# Patient Record
Sex: FEMALE | Race: WHITE | NOT HISPANIC OR LATINO | Employment: UNEMPLOYED | RURAL
[De-identification: names, ages, dates, MRNs, and addresses within clinical notes are randomized per-mention and may not be internally consistent; named-entity substitution may affect disease eponyms.]

---

## 2022-09-01 RX ORDER — BUPRENORPHINE AND NALOXONE 8; 2 MG/1; MG/1
FILM, SOLUBLE BUCCAL; SUBLINGUAL
COMMUNITY
Start: 2022-07-21 | End: 2023-03-23

## 2022-09-01 RX ORDER — NALOXONE HYDROCHLORIDE 4 MG/.1ML
SPRAY NASAL
COMMUNITY
Start: 2022-07-18

## 2022-09-01 RX ORDER — CLONIDINE HYDROCHLORIDE 0.1 MG/1
TABLET ORAL
COMMUNITY
Start: 2022-06-22 | End: 2022-09-02

## 2022-09-02 ENCOUNTER — OFFICE VISIT (OUTPATIENT)
Dept: FAMILY MEDICINE CLINIC | Facility: CLINIC | Age: 37
End: 2022-09-02

## 2022-09-02 VITALS
OXYGEN SATURATION: 98 % | DIASTOLIC BLOOD PRESSURE: 78 MMHG | HEIGHT: 64 IN | SYSTOLIC BLOOD PRESSURE: 128 MMHG | RESPIRATION RATE: 12 BRPM | TEMPERATURE: 97.6 F | HEART RATE: 84 BPM | BODY MASS INDEX: 22.88 KG/M2 | WEIGHT: 134 LBS

## 2022-09-02 DIAGNOSIS — F15.11 METHAMPHETAMINE ABUSE IN REMISSION: ICD-10-CM

## 2022-09-02 DIAGNOSIS — F41.1 GENERALIZED ANXIETY DISORDER: Primary | ICD-10-CM

## 2022-09-02 DIAGNOSIS — F17.210 MODERATE SMOKER (20 OR LESS PER DAY): ICD-10-CM

## 2022-09-02 DIAGNOSIS — F11.11 OPIOID ABUSE, IN REMISSION: ICD-10-CM

## 2022-09-02 PROBLEM — F32.A DEPRESSION: Status: ACTIVE | Noted: 2022-09-02

## 2022-09-02 PROBLEM — F41.9 ANXIETY: Status: RESOLVED | Noted: 2022-09-02 | Resolved: 2022-09-02

## 2022-09-02 PROBLEM — F41.9 ANXIETY: Status: ACTIVE | Noted: 2022-09-02

## 2022-09-02 PROCEDURE — 99203 OFFICE O/P NEW LOW 30 MIN: CPT | Performed by: INTERNAL MEDICINE

## 2022-09-02 RX ORDER — ESCITALOPRAM OXALATE 10 MG/1
10 TABLET ORAL DAILY
Qty: 30 TABLET | Refills: 1 | Status: SHIPPED | OUTPATIENT
Start: 2022-09-02 | End: 2022-09-26

## 2022-09-02 NOTE — PROGRESS NOTES
"    Follow Up Office Visit      Date: 2022   Patient Name: Ella Bravo  : 1985   MRN: [id number]     Chief Complaint:    Chief Complaint   Patient presents with   • twithching of hands     Refer need   • Anxiety       History of Present Illness: Ella Bravo is a 37 y.o. female who is here today for an introductory visit, history of methamphetamine and opiate abuse for a period of 10 years overall, including a 1 year.  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  She indicates she is \"a ball of nerves\", having some anxiousness, difficulty sleeping though she does not feel that she is depressed.  She also is always constantly moving all the time, feeling like people feel that she is on drugs secondarily.  She wonders if she may have damaged her body with her drug abuse in the past.  She actually thought she might require neurology consultation to see if there was something wrong with her body regarding this constant need to move around.  No numbness or tingling, no weakness.  Denies ever having been formally diagnosed with any mood disorder other than substance abuse, specifically no history of anxiety depression or bipolar disorder.  Denies any history of alcohol abuse..    Subjective      Review of Systems:   Review of Systems    I have reviewed the patients family history, social history, past medical history, past surgical history and have updated it as appropriate.     Medications:     Current Outpatient Medications:   •  buprenorphine-naloxone (SUBOXONE) 8-2 MG film film, Place 2 film under tongue once a day as directed Fill and start 2022, Disp: , Rfl:   •  naloxone (NARCAN) 4 MG/0.1ML nasal spray, Spray 1 spray into both nostrils as directed every 2 mins as " "needed for overdose. Repeat every 2 mins until EMS arrives or responsive, Disp: , Rfl:   •  escitalopram (Lexapro) 10 MG tablet, Take 1 tablet by mouth Daily., Disp: 30 tablet, Rfl: 1    Allergies:   No Known Allergies    Objective     Physical Exam: Please see above  Vital Signs:   Vitals:    09/02/22 1530   BP: 128/78   BP Location: Left arm   Patient Position: Sitting   Cuff Size: Adult   Pulse: 84   Resp: 12   Temp: 97.6 °F (36.4 °C)   TempSrc: Temporal   SpO2: 98%   Weight: 60.8 kg (134 lb)   Height: 162.6 cm (64\")     Body mass index is 23 kg/m².  BMI is within normal parameters. No other follow-up for BMI required.       Physical Exam  General: Pleasant slender healthy-appearing 37-year-old white female who is always constantly moving around, cannot stand still, alert and oriented, does not appear to be under the influence of any substances.  Lungs clear  Cardiac regular rate rhythm with no murmurs gallops or rubs  Neurological exam reveals cranial nerves II through XII intact, motor and sensory exam normal, biceps and patellar reflexes symmetric and normal, finger-to-nose testing normal, heel-to-toe gait normal, Romberg negative, normal narrow-based heel-to-toe gait, fluent speech.  Procedures    Results:   Mood disorder questionnaire bipolar disorder positive for 6 of 13 items consistent with a borderline positive study  MEGHAN-7 screening for anxiety score of 19 consistent with moderate anxiety  Patient health questionnaire-9 screening for depression score of 5 consistent with very borderline dysthymia    Imaging:   No valid procedures specified.     Smoking Cessation:   less than 3 minutes spent counseling Will try to cut down    Assessment / Plan      Assessment/Plan:   Diagnoses and all orders for this visit:    1. Generalized anxiety disorder (Primary)  -     escitalopram (Lexapro) 10 MG tablet; Take 1 tablet by mouth Daily.  Dispense: 30 tablet; Refill: 1  Patient having significant anxiousness which " could certainly explain some of her propensity for constant movement.  Does not appear to have any focal neurological problems at this time.  We have agreed to initiate a trial of Lexapro 10 mg daily with delayed onset and benefits anticipated and early potential onset and side effects discussed.  Reassess clinically in 1 month.  We will plan to update labs at that time.  2. 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  Encouraged ongoing abstinence.  3. Opioid abuse, in remission (HCC)  Patient indicating again she has been in remission since 2/2022.  Again I encouraged ongoing abstinence.  She is on Suboxone.  4. Moderate smoker (20 or less per day)  Advised strongly of need to discontinue habit for health purposes.      Follow Up:   Return in about 1 month (around 10/2/2022) for Recheck.      At [facility], we believe that sharing information builds trust and better relationships. You are receiving this note because you recently visited [facility]. It is possible you will see health information before a provider has talked with you about it. This kind of information can be easy to misunderstand. To help you fully understand what it means for your health, we urge you to discuss this note with your provider.    Pino Plunkett MD  [facility]

## 2022-09-26 DIAGNOSIS — F41.1 GENERALIZED ANXIETY DISORDER: ICD-10-CM

## 2022-09-26 RX ORDER — ESCITALOPRAM OXALATE 10 MG/1
10 TABLET ORAL DAILY
Qty: 30 TABLET | Refills: 1 | Status: SHIPPED | OUTPATIENT
Start: 2022-09-26 | End: 2022-11-14

## 2022-11-14 DIAGNOSIS — F41.1 GENERALIZED ANXIETY DISORDER: ICD-10-CM

## 2022-11-14 RX ORDER — ESCITALOPRAM OXALATE 10 MG/1
10 TABLET ORAL DAILY
Qty: 30 TABLET | Refills: 1 | Status: SHIPPED | OUTPATIENT
Start: 2022-11-14 | End: 2023-03-23 | Stop reason: SDUPTHER

## 2023-01-30 DIAGNOSIS — F41.1 GENERALIZED ANXIETY DISORDER: ICD-10-CM

## 2023-01-30 RX ORDER — ESCITALOPRAM OXALATE 10 MG/1
10 TABLET ORAL DAILY
Qty: 30 TABLET | Refills: 1 | OUTPATIENT
Start: 2023-01-30

## 2023-03-23 ENCOUNTER — OFFICE VISIT (OUTPATIENT)
Dept: FAMILY MEDICINE CLINIC | Facility: CLINIC | Age: 38
End: 2023-03-23
Payer: COMMERCIAL

## 2023-03-23 VITALS
DIASTOLIC BLOOD PRESSURE: 81 MMHG | OXYGEN SATURATION: 97 % | WEIGHT: 150.4 LBS | SYSTOLIC BLOOD PRESSURE: 123 MMHG | TEMPERATURE: 98.3 F | HEART RATE: 80 BPM | HEIGHT: 64 IN | BODY MASS INDEX: 25.68 KG/M2

## 2023-03-23 DIAGNOSIS — F41.1 GENERALIZED ANXIETY DISORDER: ICD-10-CM

## 2023-03-23 DIAGNOSIS — G56.03 BILATERAL CARPAL TUNNEL SYNDROME: ICD-10-CM

## 2023-03-23 DIAGNOSIS — F11.11 OPIOID ABUSE, IN REMISSION: ICD-10-CM

## 2023-03-23 DIAGNOSIS — G25.9 EXTRAPYRAMIDAL MOVEMENT DISORDER: Primary | ICD-10-CM

## 2023-03-23 DIAGNOSIS — F14.91 COCAINE USE DISORDER IN REMISSION: ICD-10-CM

## 2023-03-23 DIAGNOSIS — F17.211 CIGARETTE NICOTINE DEPENDENCE IN REMISSION: ICD-10-CM

## 2023-03-23 PROBLEM — F15.11 METHAMPHETAMINE ABUSE IN REMISSION: Status: RESOLVED | Noted: 2022-09-02 | Resolved: 2023-03-23

## 2023-03-23 RX ORDER — ESCITALOPRAM OXALATE 10 MG/1
10 TABLET ORAL DAILY
Qty: 30 TABLET | Refills: 5 | Status: SHIPPED | OUTPATIENT
Start: 2023-03-23

## 2023-03-23 NOTE — PROGRESS NOTES
Follow Up Office Visit      Date: 2023   Patient Name: Ella Bravo  : 1985   MRN: [id number]     Chief Complaint:    Chief Complaint   Patient presents with   • numbness in hands       History of Present Illness: Ella Bravo is a 38 y.o. female who is here today for 6-month follow-up having been started at that time on Lexapro 10 mg daily, noting she has had a significant improvement in her anxiety disorder since she has been on the medication.  Really does not have a lot of depressive symptoms.  She ran out of Lexapro recently has had some recurrence of anxiousness since.  She also has a history of approximate 10 years of pill form opiate abuse, most recent usage by history and 2022, and has a 2-year history of cocaine abuse, initially intranasally then subsequent intravenous, last use by history in 2021, currently under court order getting regular drug screens as well as apparently HIV and hepatitis screening.  Indicates she is very motivated to stay clean at this time.  She has a history of constant movement disorder in the past, to the point that people always think she is high on drugs, constantly moving her arms and legs, apparently having had police called on her when she was shopping.  She indicates that this has gotten better since she has stopped using illicit drugs but still occasionally bothers her especially when she is stressed out.  She indicates that she has been asked by her court  to see a neurologist to clarify this problem.  She also notes fairly regularly for the last year having some numbness in both hands when she wakes up in the morning, this rapidly resolving not occurring again till she wakes up the next morning.  She does have a history previously of left-sided carpal tunnel surgery approximately 8 years ago.  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  Finally she notes that she stopped smoking cigarettes 2 weeks ago, now vaping, after a 24-year history of smoking 1 pack/day.    Subjective      Review of Systems:   Review of Systems    I have reviewed the patients family history, social history, past medical history, past surgical history and have updated it as appropriate.     Medications:     Current Outpatient Medications:   •  buprenorphine-naloxone (SUBOXONE) 8-2 MG per SL tablet, Place 2 tablet under tongue once a day as directed fill today and start tomorrow, Disp: , Rfl:   •  escitalopram (LEXAPRO) 10 MG tablet, Take 1 tablet by mouth Daily., Disp: 30 tablet, Rfl: 5  •  naloxone (NARCAN) 4 MG/0.1ML nasal spray, Spray 1 spray into both nostrils as directed every 2 mins as needed for overdose. Repeat every 2 mins until EMS arrives or responsive, Disp: , Rfl:     Allergies:   No Known Allergies    Objective     Physical Exam: Please see above  Vital Signs:   Vitals:    03/23/23 1553   BP: 123/81   BP Location: Left arm   Patient Position: Sitting   Cuff Size: Adult   Pulse: 80   Temp: 98.3 °F (36.8 °C)   TempSrc: Temporal   SpO2: 97%   Weight: 68.2 kg (150 lb 6.4 oz)   Height: 162.6 cm (64\")     Body mass index is 25.82 kg/m².       Physical Exam  General: Pleasant slender generally healthy appearing 38-year-old female, who is alert and oriented, casually dressed in no acute distress.  She does not appear to be under the influence of any substances.  She occasionally has a slight writhing movement of her shoulders or arms, but markedly less so than when she was seen here in the office 6 months ago, when she is noted to be constantly moving around in her seat, not able to sit still.  Head and neck: No unusual facial movements, neck supple with no masses or tenderness  Lungs clear with no wheeze tachypnea or cough  Cardiac regular rate rhythm with no murmurs gallops or rubs, 2+ radial pulses bilaterally, no dependent " edema  Neurological exam reveals cranial nerves II through XII intact, motor and sensory exam grossly normal, finger-to-nose testing bilaterally intact, he shin rub with her opposite heel intact, 2+ biceps triceps brachioradialis, knee jerks and ankle jerks all symmetric, negative Romberg, heel-to-toe testing normal, gait narrow based and appears normal at this time, negative Tinel's sign and negative Phalen sign at the wrist bilaterally  Procedures    Results:   Labs:   No results found for: HGBA1C, CMP, CBCDIFFPANEL, CREAT, TSH     POCT Results (if applicable):   No results found for this or any previous visit.    Imaging:   No valid procedures specified.     Assessment / Plan      Assessment/Plan:   Diagnoses and all orders for this visit:    1. Extrapyramidal movement disorder (Primary)  -     Ambulatory Referral to Neurology  Patient had noticed significant extrapyramidal type movements of her trunk and extremities, being very restless night able to sit still, when she was last seen here 6 months ago, appears to be much improved and subtle at this time, likely related to her previous substance abuse with opiates and cocaine from which she has been abstaining by history since 2/2022.  Her neurological exam otherwise is normal.  She has never taken any atypical antipsychotics or other related medications in the past.  Her court appointed  as I understand however is requesting specifically that she get a neurology consultation for further evaluation which I think is reasonable under the circumstances, and will make an appropriate referral for second opinion.  2. Bilateral carpal tunnel syndrome  -     Ambulatory Referral to Neurology  Status post previous left carpal tunnel release procedure.  Recommended use of bilateral wrist splints at night as first treatment.  Can get second opinion from neurologist to whom she is being referred for above problem.  3. Generalized anxiety disorder  -      escitalopram (LEXAPRO) 10 MG tablet; Take 1 tablet by mouth Daily.  Dispense: 30 tablet; Refill: 5  Overall doing well taking her Lexapro 10 mg daily.  Continue same  4. Cocaine use disorder in remission  By history abstaining since 12/2021.  Apparently getting regular drug screening through court order.  5. Opioid abuse, in remission (HCC)  By history abstaining since 2/2022.  Apparently getting regular drug screening through court order.  6. Cigarette nicotine dependence in remission  Patient indicates she has been abstaining from cigarette smoking for 2 weeks now vaping, previous 24-pack-year history.  I applauded her abstinence ongoing related efforts.  Advised ultimately to stop vaping.    Follow Up:   Return in about 2 months (around 5/23/2023) for Annual physical.  Plan update related labs at that time      At [facility], we believe that sharing information builds trust and better relationships. You are receiving this note because you recently visited [facility]. It is possible you will see health information before a provider has talked with you about it. This kind of information can be easy to misunderstand. To help you fully understand what it means for your health, we urge you to discuss this note with your provider.    Pino Plunkett MD  [facility]

## 2023-04-17 ENCOUNTER — TELEPHONE (OUTPATIENT)
Dept: FAMILY MEDICINE CLINIC | Facility: CLINIC | Age: 38
End: 2023-04-17
Payer: COMMERCIAL

## 2023-04-17 DIAGNOSIS — B19.20 HEPATITIS C VIRUS INFECTION WITHOUT HEPATIC COMA, UNSPECIFIED CHRONICITY: Primary | ICD-10-CM

## 2023-04-17 NOTE — TELEPHONE ENCOUNTER
Phone conversation with patient discussing results of testing obtained by her rehab physician, Dr. Galdamez from 4/10/2023 as follows:    Viral hepatitis profile indicating positive hep C antibody titer, negative hepatitis screen B screen  Serum RPR nonreactive  HIV negative  Quantiferon TB gold plus negative  CBC revealing white count of 7.8, H&H 11.5 and 35.5 and platelets 399,000  CMP unremarkable including normal LFTs    Assessment/plan:  1.  Positive hep C virus.  Patient unaware of diagnosis.  We will make a nonemergent referral to GI for further evaluation.  Patient is scheduled to follow-up with me in May

## 2023-05-24 PROBLEM — F11.11 OPIOID ABUSE, IN REMISSION: Status: ACTIVE | Noted: 2023-05-24

## 2023-05-24 PROBLEM — B18.2 CHRONIC HEPATITIS C WITHOUT HEPATIC COMA: Status: ACTIVE | Noted: 2023-05-24

## 2023-05-24 PROBLEM — F15.11: Status: ACTIVE | Noted: 2023-05-24

## 2023-08-28 DIAGNOSIS — F41.1 GENERALIZED ANXIETY DISORDER: ICD-10-CM

## 2023-08-28 RX ORDER — ESCITALOPRAM OXALATE 10 MG/1
10 TABLET ORAL DAILY
Qty: 30 TABLET | Refills: 2 | Status: SHIPPED | OUTPATIENT
Start: 2023-08-28

## 2023-11-21 DIAGNOSIS — F41.1 GENERALIZED ANXIETY DISORDER: ICD-10-CM

## 2023-11-21 RX ORDER — ESCITALOPRAM OXALATE 10 MG/1
10 TABLET ORAL DAILY
Qty: 30 TABLET | Refills: 0 | Status: SHIPPED | OUTPATIENT
Start: 2023-11-21

## 2024-02-27 DIAGNOSIS — F41.1 GENERALIZED ANXIETY DISORDER: ICD-10-CM

## 2024-02-27 RX ORDER — ESCITALOPRAM OXALATE 10 MG/1
10 TABLET ORAL DAILY
Qty: 30 TABLET | Refills: 0 | Status: SHIPPED | OUTPATIENT
Start: 2024-02-27